# Patient Record
Sex: FEMALE | Race: WHITE | URBAN - METROPOLITAN AREA
[De-identification: names, ages, dates, MRNs, and addresses within clinical notes are randomized per-mention and may not be internally consistent; named-entity substitution may affect disease eponyms.]

---

## 2021-02-19 ENCOUNTER — HOSPITAL ENCOUNTER (OUTPATIENT)
Facility: CLINIC | Age: 41
Setting detail: OBSERVATION
Discharge: HOME | End: 2021-02-20
Attending: EMERGENCY MEDICINE | Admitting: OBSTETRICS & GYNECOLOGY
Payer: COMMERCIAL

## 2021-02-19 ENCOUNTER — ANESTHESIA (OUTPATIENT)
Dept: SURGERY | Facility: CLINIC | Age: 41
End: 2021-02-19
Payer: COMMERCIAL

## 2021-02-19 ENCOUNTER — ANESTHESIA EVENT (OUTPATIENT)
Dept: SURGERY | Facility: CLINIC | Age: 41
End: 2021-02-19
Payer: COMMERCIAL

## 2021-02-19 ENCOUNTER — TRANSFERRED RECORDS (OUTPATIENT)
Dept: HEALTH INFORMATION MANAGEMENT | Facility: CLINIC | Age: 41
End: 2021-02-19

## 2021-02-19 DIAGNOSIS — O00.90 ECTOPIC PREGNANCY, UNSPECIFIED LOCATION, UNSPECIFIED WHETHER INTRAUTERINE PREGNANCY PRESENT: ICD-10-CM

## 2021-02-19 DIAGNOSIS — O00.90 ECTOPIC PREGNANCY: ICD-10-CM

## 2021-02-19 LAB
ABO + RH BLD: NORMAL
ABO + RH BLD: NORMAL
B-HCG SERPL-ACNC: 286 IU/L (ref 0–5)
BASOPHILS # BLD AUTO: 0 10E9/L (ref 0–0.2)
BASOPHILS NFR BLD AUTO: 0.4 %
BLD GP AB SCN SERPL QL: NORMAL
BLOOD BANK CMNT PATIENT-IMP: NORMAL
DIFFERENTIAL METHOD BLD: ABNORMAL
EOSINOPHIL # BLD AUTO: 0.2 10E9/L (ref 0–0.7)
EOSINOPHIL NFR BLD AUTO: 1.5 %
ERYTHROCYTE [DISTWIDTH] IN BLOOD BY AUTOMATED COUNT: 11.9 % (ref 10–15)
HCT VFR BLD AUTO: 34.6 % (ref 35–47)
HGB BLD-MCNC: 11.8 G/DL (ref 11.7–15.7)
IMM GRANULOCYTES # BLD: 0.1 10E9/L (ref 0–0.4)
IMM GRANULOCYTES NFR BLD: 0.4 %
LABORATORY COMMENT REPORT: NORMAL
LYMPHOCYTES # BLD AUTO: 2.2 10E9/L (ref 0.8–5.3)
LYMPHOCYTES NFR BLD AUTO: 19.3 %
MCH RBC QN AUTO: 30.7 PG (ref 26.5–33)
MCHC RBC AUTO-ENTMCNC: 34.1 G/DL (ref 31.5–36.5)
MCV RBC AUTO: 90 FL (ref 78–100)
MONOCYTES # BLD AUTO: 0.8 10E9/L (ref 0–1.3)
MONOCYTES NFR BLD AUTO: 7.2 %
NEUTROPHILS # BLD AUTO: 8.1 10E9/L (ref 1.6–8.3)
NEUTROPHILS NFR BLD AUTO: 71.2 %
NRBC # BLD AUTO: 0 10*3/UL
NRBC BLD AUTO-RTO: 0 /100
PLATELET # BLD AUTO: 236 10E9/L (ref 150–450)
RBC # BLD AUTO: 3.84 10E12/L (ref 3.8–5.2)
SARS-COV-2 RNA RESP QL NAA+PROBE: NEGATIVE
SPECIMEN EXP DATE BLD: NORMAL
SPECIMEN SOURCE: NORMAL
WBC # BLD AUTO: 11.3 10E9/L (ref 4–11)

## 2021-02-19 PROCEDURE — 258N000003 HC RX IP 258 OP 636: Performed by: SURGERY

## 2021-02-19 PROCEDURE — 250N000009 HC RX 250: Performed by: NURSE ANESTHETIST, CERTIFIED REGISTERED

## 2021-02-19 PROCEDURE — 99285 EMERGENCY DEPT VISIT HI MDM: CPT | Mod: 25

## 2021-02-19 PROCEDURE — 250N000011 HC RX IP 250 OP 636: Performed by: SURGERY

## 2021-02-19 PROCEDURE — 250N000025 HC SEVOFLURANE, PER MIN: Performed by: OBSTETRICS & GYNECOLOGY

## 2021-02-19 PROCEDURE — 370N000017 HC ANESTHESIA TECHNICAL FEE, PER MIN: Performed by: OBSTETRICS & GYNECOLOGY

## 2021-02-19 PROCEDURE — 272N000001 HC OR GENERAL SUPPLY STERILE: Performed by: OBSTETRICS & GYNECOLOGY

## 2021-02-19 PROCEDURE — 86901 BLOOD TYPING SEROLOGIC RH(D): CPT | Performed by: EMERGENCY MEDICINE

## 2021-02-19 PROCEDURE — 86900 BLOOD TYPING SEROLOGIC ABO: CPT | Performed by: EMERGENCY MEDICINE

## 2021-02-19 PROCEDURE — C9803 HOPD COVID-19 SPEC COLLECT: HCPCS

## 2021-02-19 PROCEDURE — 85025 COMPLETE CBC W/AUTO DIFF WBC: CPT | Performed by: EMERGENCY MEDICINE

## 2021-02-19 PROCEDURE — 250N000011 HC RX IP 250 OP 636: Performed by: NURSE ANESTHETIST, CERTIFIED REGISTERED

## 2021-02-19 PROCEDURE — 999N000141 HC STATISTIC PRE-PROCEDURE NURSING ASSESSMENT: Performed by: OBSTETRICS & GYNECOLOGY

## 2021-02-19 PROCEDURE — 86850 RBC ANTIBODY SCREEN: CPT | Performed by: EMERGENCY MEDICINE

## 2021-02-19 PROCEDURE — 258N000001 HC RX 258: Performed by: OBSTETRICS & GYNECOLOGY

## 2021-02-19 PROCEDURE — G0378 HOSPITAL OBSERVATION PER HR: HCPCS

## 2021-02-19 PROCEDURE — 360N000076 HC SURGERY LEVEL 3, PER MIN: Performed by: OBSTETRICS & GYNECOLOGY

## 2021-02-19 PROCEDURE — 250N000009 HC RX 250: Performed by: OBSTETRICS & GYNECOLOGY

## 2021-02-19 PROCEDURE — 87635 SARS-COV-2 COVID-19 AMP PRB: CPT | Performed by: EMERGENCY MEDICINE

## 2021-02-19 PROCEDURE — 84702 CHORIONIC GONADOTROPIN TEST: CPT | Performed by: EMERGENCY MEDICINE

## 2021-02-19 PROCEDURE — 88305 TISSUE EXAM BY PATHOLOGIST: CPT | Mod: 26 | Performed by: PATHOLOGY

## 2021-02-19 PROCEDURE — 88305 TISSUE EXAM BY PATHOLOGIST: CPT | Mod: TC | Performed by: OBSTETRICS & GYNECOLOGY

## 2021-02-19 PROCEDURE — 710N000009 HC RECOVERY PHASE 1, LEVEL 1, PER MIN: Performed by: OBSTETRICS & GYNECOLOGY

## 2021-02-19 RX ORDER — DEXTROAMPHETAMINE SACCHARATE, AMPHETAMINE ASPARTATE MONOHYDRATE, DEXTROAMPHETAMINE SULFATE AND AMPHETAMINE SULFATE 6.25; 6.25; 6.25; 6.25 MG/1; MG/1; MG/1; MG/1
25 CAPSULE, EXTENDED RELEASE ORAL EVERY MORNING
COMMUNITY

## 2021-02-19 RX ORDER — NALOXONE HYDROCHLORIDE 0.4 MG/ML
0.4 INJECTION, SOLUTION INTRAMUSCULAR; INTRAVENOUS; SUBCUTANEOUS
Status: DISCONTINUED | OUTPATIENT
Start: 2021-02-19 | End: 2021-02-20

## 2021-02-19 RX ORDER — NEOSTIGMINE METHYLSULFATE 1 MG/ML
VIAL (ML) INJECTION PRN
Status: DISCONTINUED | OUTPATIENT
Start: 2021-02-19 | End: 2021-02-19

## 2021-02-19 RX ORDER — ASCORBIC ACID 250 MG
250 TABLET,CHEWABLE ORAL DAILY
COMMUNITY

## 2021-02-19 RX ORDER — LIDOCAINE HYDROCHLORIDE 20 MG/ML
INJECTION, SOLUTION INFILTRATION; PERINEURAL PRN
Status: DISCONTINUED | OUTPATIENT
Start: 2021-02-19 | End: 2021-02-19

## 2021-02-19 RX ORDER — MULTIVITAMIN,THERAPEUTIC
1 TABLET ORAL DAILY
COMMUNITY

## 2021-02-19 RX ORDER — HYDROMORPHONE HYDROCHLORIDE 1 MG/ML
.3-.5 INJECTION, SOLUTION INTRAMUSCULAR; INTRAVENOUS; SUBCUTANEOUS EVERY 5 MIN PRN
Status: DISCONTINUED | OUTPATIENT
Start: 2021-02-19 | End: 2021-02-20 | Stop reason: HOSPADM

## 2021-02-19 RX ORDER — ONDANSETRON 4 MG/1
4 TABLET, ORALLY DISINTEGRATING ORAL EVERY 30 MIN PRN
Status: DISCONTINUED | OUTPATIENT
Start: 2021-02-19 | End: 2021-02-20 | Stop reason: HOSPADM

## 2021-02-19 RX ORDER — SODIUM CHLORIDE, SODIUM LACTATE, POTASSIUM CHLORIDE, CALCIUM CHLORIDE 600; 310; 30; 20 MG/100ML; MG/100ML; MG/100ML; MG/100ML
INJECTION, SOLUTION INTRAVENOUS CONTINUOUS
Status: DISCONTINUED | OUTPATIENT
Start: 2021-02-19 | End: 2021-02-19 | Stop reason: HOSPADM

## 2021-02-19 RX ORDER — SODIUM CHLORIDE, SODIUM LACTATE, POTASSIUM CHLORIDE, CALCIUM CHLORIDE 600; 310; 30; 20 MG/100ML; MG/100ML; MG/100ML; MG/100ML
INJECTION, SOLUTION INTRAVENOUS CONTINUOUS
Status: DISCONTINUED | OUTPATIENT
Start: 2021-02-19 | End: 2021-02-20 | Stop reason: HOSPADM

## 2021-02-19 RX ORDER — MAGNESIUM HYDROXIDE 1200 MG/15ML
LIQUID ORAL PRN
Status: DISCONTINUED | OUTPATIENT
Start: 2021-02-19 | End: 2021-02-20 | Stop reason: HOSPADM

## 2021-02-19 RX ORDER — PROPOFOL 10 MG/ML
INJECTION, EMULSION INTRAVENOUS PRN
Status: DISCONTINUED | OUTPATIENT
Start: 2021-02-19 | End: 2021-02-19

## 2021-02-19 RX ORDER — FENTANYL CITRATE 50 UG/ML
INJECTION, SOLUTION INTRAMUSCULAR; INTRAVENOUS PRN
Status: DISCONTINUED | OUTPATIENT
Start: 2021-02-19 | End: 2021-02-19

## 2021-02-19 RX ORDER — GLYCOPYRROLATE 0.2 MG/ML
INJECTION, SOLUTION INTRAMUSCULAR; INTRAVENOUS PRN
Status: DISCONTINUED | OUTPATIENT
Start: 2021-02-19 | End: 2021-02-19

## 2021-02-19 RX ORDER — OXYCODONE HYDROCHLORIDE 5 MG/1
5-10 TABLET ORAL
Qty: 15 TABLET | Refills: 0 | Status: SHIPPED | OUTPATIENT
Start: 2021-02-19

## 2021-02-19 RX ORDER — DEXAMETHASONE SODIUM PHOSPHATE 4 MG/ML
INJECTION, SOLUTION INTRA-ARTICULAR; INTRALESIONAL; INTRAMUSCULAR; INTRAVENOUS; SOFT TISSUE PRN
Status: DISCONTINUED | OUTPATIENT
Start: 2021-02-19 | End: 2021-02-19

## 2021-02-19 RX ORDER — CEFAZOLIN SODIUM 1 G/3ML
INJECTION, POWDER, FOR SOLUTION INTRAMUSCULAR; INTRAVENOUS PRN
Status: DISCONTINUED | OUTPATIENT
Start: 2021-02-19 | End: 2021-02-19

## 2021-02-19 RX ORDER — NALOXONE HYDROCHLORIDE 0.4 MG/ML
0.2 INJECTION, SOLUTION INTRAMUSCULAR; INTRAVENOUS; SUBCUTANEOUS
Status: DISCONTINUED | OUTPATIENT
Start: 2021-02-19 | End: 2021-02-20

## 2021-02-19 RX ORDER — ONDANSETRON 2 MG/ML
INJECTION INTRAMUSCULAR; INTRAVENOUS PRN
Status: DISCONTINUED | OUTPATIENT
Start: 2021-02-19 | End: 2021-02-19

## 2021-02-19 RX ORDER — FENTANYL CITRATE 50 UG/ML
25-50 INJECTION, SOLUTION INTRAMUSCULAR; INTRAVENOUS
Status: DISCONTINUED | OUTPATIENT
Start: 2021-02-19 | End: 2021-02-20 | Stop reason: HOSPADM

## 2021-02-19 RX ORDER — ONDANSETRON 2 MG/ML
4 INJECTION INTRAMUSCULAR; INTRAVENOUS EVERY 30 MIN PRN
Status: DISCONTINUED | OUTPATIENT
Start: 2021-02-19 | End: 2021-02-20 | Stop reason: HOSPADM

## 2021-02-19 RX ADMIN — LIDOCAINE HYDROCHLORIDE 60 MG: 20 INJECTION, SOLUTION INFILTRATION; PERINEURAL at 21:31

## 2021-02-19 RX ADMIN — GLYCOPYRROLATE 0.2 MG: 0.2 INJECTION, SOLUTION INTRAMUSCULAR; INTRAVENOUS at 22:38

## 2021-02-19 RX ADMIN — NEOSTIGMINE METHYLSULFATE 3 MG: 1 INJECTION, SOLUTION INTRAVENOUS at 22:31

## 2021-02-19 RX ADMIN — FENTANYL CITRATE 100 MCG: 50 INJECTION, SOLUTION INTRAMUSCULAR; INTRAVENOUS at 21:31

## 2021-02-19 RX ADMIN — SODIUM CHLORIDE, POTASSIUM CHLORIDE, SODIUM LACTATE AND CALCIUM CHLORIDE: 600; 310; 30; 20 INJECTION, SOLUTION INTRAVENOUS at 20:51

## 2021-02-19 RX ADMIN — SODIUM CHLORIDE, POTASSIUM CHLORIDE, SODIUM LACTATE AND CALCIUM CHLORIDE: 600; 310; 30; 20 INJECTION, SOLUTION INTRAVENOUS at 21:26

## 2021-02-19 RX ADMIN — GLYCOPYRROLATE 0.4 MG: 0.2 INJECTION, SOLUTION INTRAMUSCULAR; INTRAVENOUS at 22:31

## 2021-02-19 RX ADMIN — MIDAZOLAM 2 MG: 1 INJECTION INTRAMUSCULAR; INTRAVENOUS at 21:23

## 2021-02-19 RX ADMIN — HYDROMORPHONE HYDROCHLORIDE 0.5 MG: 1 INJECTION, SOLUTION INTRAMUSCULAR; INTRAVENOUS; SUBCUTANEOUS at 23:34

## 2021-02-19 RX ADMIN — PROPOFOL 180 MG: 10 INJECTION, EMULSION INTRAVENOUS at 21:31

## 2021-02-19 RX ADMIN — ONDANSETRON 4 MG: 2 INJECTION INTRAMUSCULAR; INTRAVENOUS at 21:39

## 2021-02-19 RX ADMIN — CEFAZOLIN 2 G: 1 INJECTION, POWDER, FOR SOLUTION INTRAMUSCULAR; INTRAVENOUS at 21:42

## 2021-02-19 RX ADMIN — HYDROMORPHONE HYDROCHLORIDE 0.5 MG: 1 INJECTION, SOLUTION INTRAMUSCULAR; INTRAVENOUS; SUBCUTANEOUS at 21:55

## 2021-02-19 RX ADMIN — HYDROMORPHONE HYDROCHLORIDE 0.5 MG: 1 INJECTION, SOLUTION INTRAMUSCULAR; INTRAVENOUS; SUBCUTANEOUS at 23:02

## 2021-02-19 RX ADMIN — DEXAMETHASONE SODIUM PHOSPHATE 4 MG: 4 INJECTION, SOLUTION INTRA-ARTICULAR; INTRALESIONAL; INTRAMUSCULAR; INTRAVENOUS; SOFT TISSUE at 21:40

## 2021-02-19 RX ADMIN — ROCURONIUM BROMIDE 40 MG: 10 INJECTION INTRAVENOUS at 21:32

## 2021-02-19 ASSESSMENT — MIFFLIN-ST. JEOR: SCORE: 1458.33

## 2021-02-19 NOTE — ED PROVIDER NOTES
"  History   Chief Complaint:  Vaginal Bleeding     The history is provided by the patient.   History supplemented by phone call with the patient's obstetric team    Faith Wilkins is a 40 year old  female who presents with vaginal bleeding and pelvic pain.  She had onset of diffuse lower abdominal pain on the  and  with associated vaginal bleeding beginning the . She has gone through two pads today. She took Ibuprofen earlier today with good relief.  [facility] was [facility] at [facility] by an obstetrician, and Estefany states they found a mass on the right side adnexa via ultrasound that is consistent with an ectopic pregnancy. She was sent to the hospital for ultimate surgery but the OR was not ready yet, so she was sent to the ER. She is not on any fertility treatments.  She denies vomiting, diarrhea, cough, shortness of breath, or other concerns. She is not sure how far along she is in the pregnancy with a normal period last in December and a 16 day \"period\" in January. She has had 3 miscarriages in the past ending in the first trimester.  She is not on blood thinners.    Review of Systems   All other systems reviewed and are negative.      Allergies:  The patient has no known allergies.     Medications:  Adderall XR    Past Medical History:    ADHD  Chondromalacia of patella      Past Surgical History:    [place] tooth extraction    Social History:  The patient presents to the ER from clinic.   No smoking history.     Physical Exam     Patient Vitals for the past 24 hrs:   BP Temp Temp src Pulse Resp SpO2   21 1754 112/76 98.4  F (36.9  C) Oral 87 18 97 %       Physical Exam  General: Nontoxic-appearing woman sitting upright in room 1  HENT: mucous membranes moist  CV: rate as above, regular rhythm  Resp: normal effort, speaks in full phrases, no stridor, no cough observed  GI: abdomen soft and minimal bilateral tenderness to far lower abdomen, no guarding, no palpable masses or " uterine fundus  : deferred  MSK: no bony tenderness, no CVAT  Skin: appropriately warm and dry, no abdominal rash  Neuro: alert, clear speech, oriented  Psych: cooperative    Emergency Department Course     Laboratory:  CBC: WBC 11.3(H), HGB 11.8,    ABO/RH type and screen: O+, antibody negative   HCG qualitative: 286(H)    Asymptomatic COVID19 Virus PCR by nasopharyngeal swab pending     Emergency Department Course:    Reviewed:  I reviewed nursing notes, vitals, past medical history and care everywhere    Assessments:  1735 I obtained history and examined the patient as noted above.   1850 I rechecked the patient and explained findings.     Consults:   1820 I spoke with Dr. Loja of the OB GYN service from [facility] regarding patient's presentation, findings, and plan of care. She is comfortable admitting the patient until and OR becomes available.     Disposition:  The patient was admitted to the hospital under the care of Dr. Loja.       Impression & Plan   Medical Decision Making:  She was sent here from her OB clinic in the setting of a recent positive pregnancy test, vaginal bleeding, pelvic pain, and an adnexal mass seen on ultrasound in clinic just prior to arrival highly suspicious for an ectopic pregnancy.  Plan was in place for her to go to the operating room with OB for surgical intervention, but due to inability to arrange for her to be taken directly to the operating room, she was sent to the emergency room.  Due to ongoing delays and operative room availability, patient will be placed in the observation unit while awaiting surgical intervention later today.  I spoke with the surgeon who anticipates procedural intervention at the earliest availability.  Hemodynamic status is satisfactory at this time.  She will be kept NPO.  Patient understands and agrees with this provisional plan.      Covid-19  Faith Wilkins was evaluated during a global COVID-19 pandemic, which necessitated  consideration that the patient might be at risk for infection with the SARS-CoV-2 virus that causes COVID-19.   Applicable protocols for evaluation were followed during the patient's care.   COVID-19 was considered as part of the patient's evaluation. The plan for testing is:  a test was obtained during this visit.    Diagnosis:    ICD-10-CM    1. Ectopic pregnancy, unspecified location, unspecified whether intrauterine pregnancy present  O00.90 CBC with platelets differential     HCG quantitative pregnancy     ABO/Rh type and screen     Asymptomatic SARS-CoV-2 COVID-19 Virus (Coronavirus) by PCR       Scribe Disclosure:  I, Kalyanidee Fajardo, am serving as a scribe at 5:32 PM on 2/19/2021 to document services personally performed by Efe Abraham MD based on my observations and the provider's statements to me.     This note was completed in part using Dragon voice recognition software. Although reviewed after completion, some word and grammatical errors may occur.        Efe Abraham MD  02/19/21 1938

## 2021-02-19 NOTE — H&P (VIEW-ONLY)
"  History   Chief Complaint:  Vaginal Bleeding     The history is provided by the patient.   History supplemented by phone call with the patient's obstetric team    Faith Wilkisn is a 40 year old  female who presents with vaginal bleeding and pelvic pain.  She had onset of diffuse lower abdominal pain on the  and  with associated vaginal bleeding beginning the . She has gone through two pads today. She took Ibuprofen earlier today with good relief.  She was seen at [facility] today by an obstetrician, and Estefany states they found a mass on the right side adnexa via ultrasound that is consistent with an ectopic pregnancy. She was sent to the hospital for ultimate surgery but the OR was not ready yet, so she was sent to the ER. She is not on any fertility treatments.  She denies vomiting, diarrhea, cough, shortness of breath, or other concerns. She is not sure how far along she is in the pregnancy with a normal period last in December and a 16 day \"period\" in January. She has had 3 miscarriages in the past ending in the first trimester.  She is not on blood thinners.    Review of Systems   All other systems reviewed and are negative.      Allergies:  The patient has no known allergies.     Medications:  Adderall XR    Past Medical History:    ADHD  Chondromalacia of patella      Past Surgical History:    [place] tooth extraction    Social History:  The patient presents to the ER from clinic.   No smoking history.     Physical Exam     Patient Vitals for the past 24 hrs:   BP Temp Temp src Pulse Resp SpO2   21 1754 112/76 98.4  F (36.9  C) Oral 87 18 97 %       Physical Exam  General: Nontoxic-appearing woman sitting upright in room 1  HENT: mucous membranes moist  CV: rate as above, regular rhythm  Resp: normal effort, speaks in full phrases, no stridor, no cough observed  GI: abdomen soft and minimal bilateral tenderness to far lower abdomen, no guarding, no palpable masses or " uterine fundus  : deferred  MSK: no bony tenderness, no CVAT  Skin: appropriately warm and dry, no abdominal rash  Neuro: alert, clear speech, oriented  Psych: cooperative    Emergency Department Course     Laboratory:  CBC: WBC 11.3(H), HGB 11.8,    ABO/RH type and screen: O+, antibody negative   HCG qualitative: 286(H)    Asymptomatic COVID19 Virus PCR by nasopharyngeal swab pending     Emergency Department Course:    Reviewed:  I reviewed nursing notes, vitals, past medical history and care everywhere    Assessments:  1735 I obtained history and examined the patient as noted above.   1850 I rechecked the patient and explained findings.     Consults:   1820 I spoke with Dr. Loja of the OB GYN service from [facility] regarding patient's presentation, findings, and plan of care. She is comfortable admitting the patient until and OR becomes available.     Disposition:  The patient was admitted to the hospital under the care of Dr. Loja.       Impression & Plan   Medical Decision Making:  She was sent here from her OB clinic in the setting of a recent positive pregnancy test, vaginal bleeding, pelvic pain, and an adnexal mass seen on ultrasound in clinic just prior to arrival highly suspicious for an ectopic pregnancy.  Plan was in place for her to go to the operating room with OB for surgical intervention, but due to inability to arrange for her to be taken directly to the operating room, she was sent to the emergency room.  Due to ongoing delays and operative room availability, patient will be placed in the observation unit while awaiting surgical intervention later today.  I spoke with the surgeon who anticipates procedural intervention at the earliest availability.  Hemodynamic status is satisfactory at this time.  She will be kept NPO.  Patient understands and agrees with this provisional plan.      Covid-19  Faith Wilkins was evaluated during a global COVID-19 pandemic, which necessitated  consideration that the patient might be at risk for infection with the SARS-CoV-2 virus that causes COVID-19.   Applicable protocols for evaluation were followed during the patient's care.   COVID-19 was considered as part of the patient's evaluation. The plan for testing is:  a test was obtained during this visit.    Diagnosis:    ICD-10-CM    1. Ectopic pregnancy, unspecified location, unspecified whether intrauterine pregnancy present  O00.90 CBC with platelets differential     HCG quantitative pregnancy     ABO/Rh type and screen     Asymptomatic SARS-CoV-2 COVID-19 Virus (Coronavirus) by PCR       Scribe Disclosure:  I, Kalyanidee Fajardo, am serving as a scribe at 5:32 PM on 2/19/2021 to document services personally performed by Efe Abraham MD based on my observations and the provider's statements to me.     This note was completed in part using Dragon voice recognition software. Although reviewed after completion, some word and grammatical errors may occur.        Efe Abraham MD  02/19/21 1938

## 2021-02-20 VITALS
TEMPERATURE: 96.1 F | HEIGHT: 66 IN | WEIGHT: 170.1 LBS | DIASTOLIC BLOOD PRESSURE: 63 MMHG | RESPIRATION RATE: 14 BRPM | SYSTOLIC BLOOD PRESSURE: 104 MMHG | BODY MASS INDEX: 27.34 KG/M2 | OXYGEN SATURATION: 100 % | HEART RATE: 95 BPM

## 2021-02-20 LAB — GLUCOSE BLDC GLUCOMTR-MCNC: 170 MG/DL (ref 70–99)

## 2021-02-20 PROCEDURE — 258N000003 HC RX IP 258 OP 636: Performed by: OBSTETRICS & GYNECOLOGY

## 2021-02-20 PROCEDURE — 96375 TX/PRO/DX INJ NEW DRUG ADDON: CPT

## 2021-02-20 PROCEDURE — G0378 HOSPITAL OBSERVATION PER HR: HCPCS

## 2021-02-20 PROCEDURE — 96374 THER/PROPH/DIAG INJ IV PUSH: CPT

## 2021-02-20 PROCEDURE — 999N001017 HC STATISTIC GLUCOSE BY METER IP

## 2021-02-20 PROCEDURE — 96376 TX/PRO/DX INJ SAME DRUG ADON: CPT

## 2021-02-20 PROCEDURE — 250N000011 HC RX IP 250 OP 636: Performed by: OBSTETRICS & GYNECOLOGY

## 2021-02-20 RX ORDER — ACETAMINOPHEN 325 MG/1
650 TABLET ORAL EVERY 6 HOURS PRN
Status: DISCONTINUED | OUTPATIENT
Start: 2021-02-20 | End: 2021-02-20 | Stop reason: HOSPADM

## 2021-02-20 RX ORDER — HYDROMORPHONE HYDROCHLORIDE 1 MG/ML
0.2 INJECTION, SOLUTION INTRAMUSCULAR; INTRAVENOUS; SUBCUTANEOUS
Status: DISCONTINUED | OUTPATIENT
Start: 2021-02-20 | End: 2021-02-20 | Stop reason: HOSPADM

## 2021-02-20 RX ORDER — OXYCODONE HYDROCHLORIDE 5 MG/1
5-10 TABLET ORAL
Status: DISCONTINUED | OUTPATIENT
Start: 2021-02-20 | End: 2021-02-20 | Stop reason: HOSPADM

## 2021-02-20 RX ORDER — HYDROXYZINE HYDROCHLORIDE 25 MG/1
25 TABLET, FILM COATED ORAL EVERY 6 HOURS PRN
Status: DISCONTINUED | OUTPATIENT
Start: 2021-02-20 | End: 2021-02-20 | Stop reason: HOSPADM

## 2021-02-20 RX ORDER — ONDANSETRON 4 MG/1
4 TABLET, ORALLY DISINTEGRATING ORAL EVERY 6 HOURS PRN
Status: DISCONTINUED | OUTPATIENT
Start: 2021-02-20 | End: 2021-02-20 | Stop reason: HOSPADM

## 2021-02-20 RX ORDER — PROCHLORPERAZINE MALEATE 10 MG
10 TABLET ORAL EVERY 6 HOURS PRN
Status: DISCONTINUED | OUTPATIENT
Start: 2021-02-20 | End: 2021-02-20 | Stop reason: HOSPADM

## 2021-02-20 RX ORDER — SODIUM CHLORIDE, SODIUM LACTATE, POTASSIUM CHLORIDE, CALCIUM CHLORIDE 600; 310; 30; 20 MG/100ML; MG/100ML; MG/100ML; MG/100ML
INJECTION, SOLUTION INTRAVENOUS CONTINUOUS
Status: DISCONTINUED | OUTPATIENT
Start: 2021-02-20 | End: 2021-02-20 | Stop reason: HOSPADM

## 2021-02-20 RX ORDER — KETOROLAC TROMETHAMINE 30 MG/ML
30 INJECTION, SOLUTION INTRAMUSCULAR; INTRAVENOUS EVERY 6 HOURS
Status: DISCONTINUED | OUTPATIENT
Start: 2021-02-20 | End: 2021-02-20 | Stop reason: HOSPADM

## 2021-02-20 RX ORDER — LIDOCAINE 40 MG/G
CREAM TOPICAL
Status: DISCONTINUED | OUTPATIENT
Start: 2021-02-20 | End: 2021-02-20 | Stop reason: HOSPADM

## 2021-02-20 RX ORDER — NALOXONE HYDROCHLORIDE 0.4 MG/ML
0.4 INJECTION, SOLUTION INTRAMUSCULAR; INTRAVENOUS; SUBCUTANEOUS
Status: DISCONTINUED | OUTPATIENT
Start: 2021-02-20 | End: 2021-02-20 | Stop reason: HOSPADM

## 2021-02-20 RX ORDER — IBUPROFEN 600 MG/1
600 TABLET, FILM COATED ORAL EVERY 6 HOURS PRN
Status: DISCONTINUED | OUTPATIENT
Start: 2021-02-21 | End: 2021-02-20 | Stop reason: HOSPADM

## 2021-02-20 RX ORDER — ONDANSETRON 2 MG/ML
4 INJECTION INTRAMUSCULAR; INTRAVENOUS EVERY 6 HOURS PRN
Status: DISCONTINUED | OUTPATIENT
Start: 2021-02-20 | End: 2021-02-20 | Stop reason: HOSPADM

## 2021-02-20 RX ORDER — NALOXONE HYDROCHLORIDE 0.4 MG/ML
0.2 INJECTION, SOLUTION INTRAMUSCULAR; INTRAVENOUS; SUBCUTANEOUS
Status: DISCONTINUED | OUTPATIENT
Start: 2021-02-20 | End: 2021-02-20 | Stop reason: HOSPADM

## 2021-02-20 RX ADMIN — SODIUM CHLORIDE, POTASSIUM CHLORIDE, SODIUM LACTATE AND CALCIUM CHLORIDE: 600; 310; 30; 20 INJECTION, SOLUTION INTRAVENOUS at 10:23

## 2021-02-20 RX ADMIN — KETOROLAC TROMETHAMINE 30 MG: 30 INJECTION, SOLUTION INTRAMUSCULAR at 01:45

## 2021-02-20 RX ADMIN — ONDANSETRON 4 MG: 2 INJECTION INTRAMUSCULAR; INTRAVENOUS at 05:22

## 2021-02-20 RX ADMIN — KETOROLAC TROMETHAMINE 30 MG: 30 INJECTION, SOLUTION INTRAMUSCULAR at 13:10

## 2021-02-20 RX ADMIN — KETOROLAC TROMETHAMINE 30 MG: 30 INJECTION, SOLUTION INTRAMUSCULAR at 07:38

## 2021-02-20 RX ADMIN — SODIUM CHLORIDE, POTASSIUM CHLORIDE, SODIUM LACTATE AND CALCIUM CHLORIDE: 600; 310; 30; 20 INJECTION, SOLUTION INTRAVENOUS at 00:35

## 2021-02-20 ASSESSMENT — MIFFLIN-ST. JEOR: SCORE: 1463.32

## 2021-02-20 NOTE — PLAN OF CARE
A/O. Ambulating in room and tanner.  Taking diet well. Voiding in good amounts. Discharge instruction and meds given to patient. IV dced. Patient discharged via w/c to family and home.

## 2021-02-20 NOTE — ED NOTES
[facility]  ED Nurse Handoff Report    ED Chief complaint: Vaginal Bleeding      ED Diagnosis:   Final diagnoses:   Ectopic pregnancy, unspecified location, unspecified whether intrauterine pregnancy present       Code Status: Full Code    Allergies: Not on File    Patient Story: [facility] seen at [facility] today by Dr. Loja, and Estefany states they found a mass on the right side via ultrasound that seems consistent with an ectopic pregnancy.  Focused Assessment:  Pt at the hospital for per Dr Lopez request- surgery tonight but the OR not ready yet.  Pt denies needing any pain medication at this time.   Results for orders placed or performed during the hospital encounter of 02/19/21   CBC with platelets differential     Status: Abnormal   Result Value Ref Range    WBC 11.3 (H) 4.0 - 11.0 10e9/L    RBC Count 3.84 3.8 - 5.2 10e12/L    Hemoglobin 11.8 11.7 - 15.7 g/dL    Hematocrit 34.6 (L) 35.0 - 47.0 %    MCV 90 78 - 100 fl    MCH 30.7 26.5 - 33.0 pg    MCHC 34.1 31.5 - 36.5 g/dL    RDW 11.9 10.0 - 15.0 %    Platelet Count 236 150 - 450 10e9/L    Diff Method Automated Method     % Neutrophils 71.2 %    % Lymphocytes 19.3 %    % Monocytes 7.2 %    % Eosinophils 1.5 %    % Basophils 0.4 %    % Immature Granulocytes 0.4 %    Nucleated RBCs 0 0 /100    Absolute Neutrophil 8.1 1.6 - 8.3 10e9/L    Absolute Lymphocytes 2.2 0.8 - 5.3 10e9/L    Absolute Monocytes 0.8 0.0 - 1.3 10e9/L    Absolute Eosinophils 0.2 0.0 - 0.7 10e9/L    Absolute Basophils 0.0 0.0 - 0.2 10e9/L    Abs Immature Granulocytes 0.1 0 - 0.4 10e9/L    Absolute Nucleated RBC 0.0    HCG quantitative pregnancy     Status: Abnormal   Result Value Ref Range    HCG Quantitative Serum 286 (H) 0 - 5 IU/L   ABO/Rh type and screen     Status: None (In process)   Result Value Ref Range    ABO PENDING     Antibody Screen PENDING     Test Valid Only At [facility]        Specimen Expires 02/22/2021          Treatments and/or  interventions provided: Monitoring  Patient's response to treatments and/or interventions: Tolerating    To be done/followed up on inpatient unit:  Monitor/surgery    Does this patient have any cognitive concerns?: None    Activity level - Baseline/Home:  Independent  Activity Level - Current:   Independent    Patient's Preferred language: Data Unavailable   Needed?: No    Isolation: None  Infection: Not Applicable  Patient tested for COVID 19 prior to admission: YES  Bariatric?: No    Vital Signs:   Vitals:    02/19/21 1754   BP: 112/76   Pulse: 87   Resp: 18   Temp: 98.4  F (36.9  C)   TempSrc: Oral   SpO2: 97%       Cardiac Rhythm:     Was the PSS-3 completed:   Yes  What interventions are required if any?               Family Comments: None  OBS brochure/video discussed/provided to patient/family: Yes              Name of person given brochure if not patient:               Relationship to patient:     For the majority of the shift this patient's behavior was Green.   Behavioral interventions performed were   .    ED NURSE PHONE NUMBER: *[phone]

## 2021-02-20 NOTE — BRIEF OP NOTE
[facility]    Brief Operative Note    Pre-operative diagnosis: Ectopic pregnancy [O00.90], left ovarian cyst, possible endometrial polyp  Post-operative diagnosis Right tubal pregnancy, simple left ovarian cyst, possible endometrial polyp, pelvic adhesions    Procedure: Procedure(s):  LAPAROSCOPIC RIGHT SALPINGECTOMY  DILATION AND CURETTAGE, USING SUCTION  DRAINAGE OF LEFT OVARIAN CYST  Surgeon: Surgeon(s) and Role:     * Nano Loja MD - Primary  Anesthesia: General   Estimated blood loss: 10 cc  Drains: None  Specimens:   ID Type Source Tests Collected by Time Destination   A : Endometrial Curretting Tissue Endometrium SURGICAL PATHOLOGY EXAM Nano Loja MD 2/19/2021  9:52 PM    B : Right Fallopian Tube with Ectopic Pregnancy Tissue Fallopian Tube, Right SURGICAL PATHOLOGY EXAM Nano Loja MD 2/19/2021 10:16 PM      Findings:  Right tubal ectopic in mid to distal tube, approx 5 x 2 cm with fimbriated end adhered to right ovary and right pelvic sidewall with clot otherwise normal right ovary. Minimal tissue in endometrial cavity and no obvious polyp.  Simple left ovarian cyst drained of straw colored fluid. Left fallopian tube abnormal - only 2 cm in length with no distal tube identified. Omentum adhered to left pelvic sidewall and undersurface of left ovary (attachment to left ovary taken down).   Complications: None.  Implants: * No implants in log *

## 2021-02-20 NOTE — PROGRESS NOTES
Observation goals PRIOR TO DISCHARGE     Comments: List all goals to be met before discharge home:        Ambulatory: partially met, requiring 2 people  Pain well controlled: partially met  Urinating without difficulty: not met, required straight cath  Tolerating diet: partially met, tolerating sips of clear liquid     Notify Provider when observation goals have been met and patient is ready for discharge.

## 2021-02-20 NOTE — PROGRESS NOTES
Patient's purse, rings (2), and watch sent home with patient's mother. Clothing, wig, shoes, phone, and glasses labeled and sent to PACU.

## 2021-02-20 NOTE — PLAN OF CARE
RECEIVING UNIT ED HANDOFF REVIEW    ED Nurse Handoff Report was reviewed by: Lata Burroughs RN on February 19, 2021 at 7:52 PM

## 2021-02-20 NOTE — ANESTHESIA PROCEDURE NOTES
Airway   Date/Time: 2/19/2021 9:34 PM   Patient location during procedure: OR  Staff -   Performed By: CRNA    Consent for Airway   Urgency: elective    Indications and Patient Condition  Indications for airway management: chaya-procedural and airway protection  Mask difficulty assessment: 1 - vent by mask    Final Airway Details  Final airway type: endotracheal airway  Successful airway:ETT - single  Endotracheal Airway Details   ETT size (mm): 7.0  Cuffed: yes  Cuff volume (mL): 7  Successful intubation technique: direct laryngoscopy  Grade View of Cords: 1  Adjucts: stylet  Measured from: lips  Secured at (cm): 21  Secured with: pink tape    Post intubation assessment   Placement verified by: capnometry, equal breath sounds and chest rise   Number of attempts at approach: 1  Secured with:pink tape

## 2021-02-20 NOTE — OP NOTE
Procedure Date: 02/19/2021      PREOPERATIVE DIAGNOSES:   1.  Right ectopic pregnancy.   2.  Left ovarian cyst.   3.  Possible endometrial polyp.      POSTOPERATIVE DIAGNOSES:   1.  Right fallopian tube ectopic pregnancy.   2.  Simple left ovarian cyst.   3.  Possible endometrial polyp.   4.  Pelvic adhesions      PROCEDURE:   1.  Operative laparoscopy with right salpingectomy, left ovarian cyst drainage, and lysis of adhesions.   2.  Suction dilation and curettage.      SURGEON:  Nano Loja MD      ANESTHESIA:  General endotracheal.      DRAINS:  None.      IMPLANTS:  None.      COMPLICATIONS:  None.      SPECIMENS:  Right fallopian tube with ectopic pregnancy and endometrial curettings.      OPERATIVE FINDINGS:  Suction curettage removed a very small amount of tissue.  There was no obvious endometrial polyp.  Laparoscopy revealed there to be a right fallopian tube ectopic pregnancy in the mid to distal portion of the fallopian tube, measuring 5 x 2 cm.  The distal portion of the fallopian tube had clot extruding from the end of it, and it was slightly adhered with a clot to the right ovary and the right pelvic sidewall.  The uterus appeared normal.  The left ovary had a 4 cm simple ovarian cyst that was filled with clear, straw-colored fluid.  The left fallopian tube was abnormal.  The left fallopian tube was only approximately 3 cm in length and appeared to be missing the distal portion of the tube.  There were no fimbria visualized.  The omentum was adhered to the left pelvic sidewall and undersurface of the left ovary.  The adhesions from the omentum to the undersurface of the left ovary were lysed; however, the adhesions from the omentum to the left pelvic sidewall were not taken down, as these were dense, and the ureter was not well visualized.  It was unclear what was causing the adhesions or what had caused the abnormality in the left fallopian tube.  Upper abdomen appeared normal.      OPERATIVE TECHNIQUE:   After informed consent, the patient was taken to the operating room, where she was given a general anesthetic.  She was placed in dorsal lithotomy position and prepped and draped in the usual sterile fashion.  A timeout was performed.  A speculum was inserted in the vagina.  Her cervix was grasped with a single-tooth tenaculum.  Her cervix was dilated to 7 mm.  A 7 mm curved suction curette was used to make several passes, with no significant return of tissue.  A sharp curettage was then performed, with minimal tissue returned as well.  The endometrium was smooth to curettage, and there were no obvious polyps.  A ROBERTO uterine manipulator was inserted.  The tenaculum and speculum were removed.  Attention was then turned to the abdomen, where a 5 mm infraumbilical skin incision was made with the scalpel.  The abdomen was tented up as the Step Veress needle was inserted.  Saline drop test was performed.  The abdomen was then insufflated under low pressure with carbon dioxide.  The Veress needle was removed.  The 5 mm trocar was then placed through the incision, and intra-abdominal placement was confirmed with the laparoscope.  A 5 mm left lower quadrant trocar and a 5 mm mid abdominal trocar were both placed under direct visualization, noting good hemostasis.  The decision was made to remove the right ovary, as there was significant damage that appeared to it from the ectopic pregnancy.  The PK cutting forceps was used to cauterize and transect the right mesosalpinx and the right proximal fallopian tube.  The right fallopian tube with its ectopic pregnancy inside of it was placed into a 5 mm retrieval bag and removed without difficulty and handed off for pathology.  The pelvis was irrigated.  The right salpingectomy site was noted to be hemostatic.  The omental adhesions to the undersurface of the left ovary were cauterized and transected with the PK cutting forceps.  The hook cautery was used to make a 1 cm incision  over the left ovarian cyst, and this was drained of clear fluid, and the incision site was noted to be hemostatic.  The pelvis was again irrigated.  All sites were noted to be hemostatic.  The gas and trocars were removed from the abdomen.  The skin incisions were closed with subcuticular 4-0 Vicryl, Steri-Strips and Band-Aids.  The uterine manipulator was then removed as well.  The patient tolerated the procedure well.  Counts were correct x 2.  She was transferred to the recovery room in stable condition.         LEAH BISHOP MD             D: 2021   T: 2021   MT: JAYLEEN      Name:     DARBY RAINEY   MRN:      [id number]        Account:        [id number]   :      1980           Procedure Date: 2021      Document: [id number]

## 2021-02-20 NOTE — ANESTHESIA POSTPROCEDURE EVALUATION
Patient: Faith Wilkins    Procedure(s):  LAPAROSCOPIC RIGHT SALPINGECTOMY  DILATION AND CURETTAGE, USING SUCTION  DRAINAGE OF LEFT OVARIAN CYST    Diagnosis:Ectopic pregnancy [O00.90]  Diagnosis Additional Information: No value filed.    Anesthesia Type:  General    Note:  Disposition: Inpatient   Postop Pain Control: Uneventful            Sign Out: Well controlled pain   PONV: No   Neuro/Psych: Uneventful            Sign Out: Acceptable/Baseline neuro status   Airway/Respiratory: Uneventful            Sign Out: Acceptable/Baseline resp. status   CV/Hemodynamics: Uneventful            Sign Out: Acceptable CV status   Other NRE: NONE   DID A NON-ROUTINE EVENT OCCUR? No         Last vitals:  Vitals:    02/20/21 0456 02/20/21 0550 02/20/21 0556   BP: 101/58     Pulse: 85  72   Resp: 11  12   Temp: 35.4  C (95.8  F)     SpO2: 99% 96% 99%       Last vitals prior to Anesthesia Care Transfer:  CRNA VITALS  2/19/2021 2215 - 2/19/2021 2315      2/19/2021             Pulse:  103    Ht Rate:  101    SpO2:  97 %    Resp Rate (observed):  15    Resp Rate (set):  10          Electronically Signed By: Amandeep Perez MD  February 20, 2021  6:34 AM

## 2021-02-20 NOTE — ANESTHESIA PREPROCEDURE EVALUATION
Anesthesia Pre-Procedure Evaluation    Patient: Faith Wilkins   MRN: [id number] : 1980        Preoperative Diagnosis: Ectopic pregnancy [O00.90]   Procedure : Procedure(s):  LAPAROSCOPIC SALPINGECTOMY OR SALPINGOSTOMY  DILATION AND CURETTAGE  POSSIBLE LEFT OVARIAN CYSTECTOMY     Past Medical History:   Diagnosis Date     Polycystic ovaries       History reviewed. No pertinent surgical history.   No Known Allergies   Social History     Tobacco Use     Smoking status: Never Smoker     Smokeless tobacco: Never Used   Substance Use Topics     Alcohol use: Not Currently      Wt Readings from Last 1 Encounters:   21 76.7 kg (169 lb)        Anesthesia Evaluation            ROS/MED HX  ENT/Pulmonary:  - neg pulmonary ROS     Neurologic:  - neg neurologic ROS     Cardiovascular:  - neg cardiovascular ROS     METS/Exercise Tolerance:     Hematologic:  - neg hematologic  ROS     Musculoskeletal:  - neg musculoskeletal ROS     GI/Hepatic:  - neg GI/hepatic ROS  (-) GERD   Renal/Genitourinary:  - neg Renal ROS     Endo:  - neg endo ROS     Psychiatric/Substance Use:     (+) psychiatric history other (comment) (ADHD)     Infectious Disease:  - neg infectious disease ROS     Malignancy:       Other:            Physical Exam    Airway        Mallampati: I   TM distance: > 3 FB   Neck ROM: full   Mouth opening: > 3 cm    Respiratory Devices and Support         Dental  no notable dental history         Cardiovascular   cardiovascular exam normal          Pulmonary   pulmonary exam normal                OUTSIDE LABS:  CBC:   Lab Results   Component Value Date    WBC 11.3 (H) 2021    HGB 11.8 2021    HCT 34.6 (L) 2021     2021     BMP: No results found for: NA, POTASSIUM, CHLORIDE, CO2, BUN, CR, GLC  COAGS: No results found for: PTT, INR, FIBR  POC: No results found for: BGM, HCG, HCGS  HEPATIC: No results found for: ALBUMIN, PROTTOTAL, ALT, AST, GGT, ALKPHOS, BILITOTAL, BILIDIRECT,  SOLA  OTHER: No results found for: PH, LACT, A1C, SILVESTRE, PHOS, MAG, LIPASE, AMYLASE, TSH, T4, T3, CRP, SED    Anesthesia Plan    ASA Status:  1   NPO Status:  NPO Appropriate    Anesthesia Type: General.     - Airway: ETT   Induction: RSI, Intravenous, Propofol.   Maintenance: Balanced.        Consents    Anesthesia Plan(s) and associated risks, benefits, and realistic alternatives discussed. Questions answered and patient/representative(s) expressed understanding.     - Discussed with:  Patient         Postoperative Care    Pain management: IV analgesics.   PONV prophylaxis: Ondansetron (or other 5HT-3), Dexamethasone or Solumedrol, Background Propofol Infusion     Comments:                Amandeep Perez MD

## 2021-02-20 NOTE — PLAN OF CARE
A&Ox4. VSS on 1L NC. Capnography on. Pt is tearful at times. Pt ambulated to bathroom but became nauseous and had 3 episodes of emesis. PRN Zofran and cool wash cloth given with relief. Bladder scanned for 630ml, attempted to void but only had about 30ml of red output. Straight cathed for 600ml. Pad was changed twice with minimal vaginal bleeding. Ice packs applied to abdomen. Scheduled Toradol given for pain. Incisions are CDI. Tolerating few sips of clear liquid. IVF @100. Reports dizziness when laying down and when walking. Denies SOB and lightheadedness. POD 1 BG was 170. Continue to monitor.

## 2021-02-20 NOTE — PLAN OF CARE
Observation goals PRIOR TO DISCHARGE     Comments: List all goals to be met before discharge home:        Ambulatory: ambulated once in [facility]  Pain well controlled: using Toradol and ice  Urinating without difficulty: voided once  Tolerating diet: taking fluids and crackers    A/O. Up with standby assist. Taking liquids well. No further nausea or dizziness. Pale. Scant amount of old drainage on chaya pad. Incisions c/d/i.    Notify Provider when observation goals have been met and patient is ready for discharge.

## 2021-02-20 NOTE — PROGRESS NOTES
Patient transferred to observation unit. Report given to ERNESTO Stephenson. Patient belongings sent with patient (clothes, wig, shoes, phone, glasses, cell phone). Prescription sent with chart.

## 2021-02-20 NOTE — PHARMACY-ADMISSION MEDICATION HISTORY
Pharmacy Medication History  Admission medication history interview status for the 2/19/2021  admission is complete. See EPIC admission navigator for prior to admission medications     Location of Interview: Phone  Medication history sources: Patient    Significant changes made to the medication list:  Added entire list    In the past week, patient estimated taking medication this percent of the time: greater than 90%    Additional medication history information:   none    Medication reconciliation completed by provider prior to medication history? No    Time spent in this activity: 15 mins    Prior to Admission medications    Medication Sig Last Dose Taking? Auth Provider   amphetamine-dextroamphetamine (ADDERALL XR) 25 MG 24 hr capsule Take 25 mg by mouth every morning 2/19/2021 at AM Yes Unknown, Entered By History   ascorbic acid (VITAMIN C) 250 MG CHEW chewable tablet Take 250 mg by mouth daily 2/18/2021 at AM Yes Unknown, Entered By History   Multiple Vitamins-Minerals (ZINC PO) Take 1 tablet by mouth daily 2/18/2021 at AM Yes Unknown, Entered By History   multivitamin, therapeutic (THERA-VIT) TABS tablet Take 1 tablet by mouth daily 2/18/2021 at AM Yes Unknown, Entered By History   VITAMIN D PO Take 1 tablet by mouth daily 2/18/2021 at AM Yes Unknown, Entered By History       The information provided in this note is only as accurate as the sources available at the time of update(s)     Eulalia Sanchez PharmD

## 2021-02-20 NOTE — ANESTHESIA CARE TRANSFER NOTE
Patient: Faith Wilkins    Procedure(s):  LAPAROSCOPIC RIGHT SALPINGECTOMY  DILATION AND CURETTAGE, USING SUCTION  DRAINAGE OF LEFT OVARIAN CYST    Diagnosis: Ectopic pregnancy [O00.90]  Diagnosis Additional Information: No value filed.    Anesthesia Type:   General     Note:    Oropharynx: oropharynx clear of all foreign objects  Level of Consciousness: awake  Oxygen Supplementation: nasal cannula    Independent Airway: airway patency satisfactory and stable  Dentition: dentition unchanged  Vital Signs Stable: post-procedure vital signs reviewed and stable  Report to RN Given: handoff report given  Patient transferred to: PACU          Vitals: (Last set prior to Anesthesia Care Transfer)  CRNA VITALS  2/19/2021 2215 - 2/19/2021 2252      2/19/2021             Pulse:  103    Ht Rate:  101    SpO2:  97 %    Resp Rate (observed):  15    Resp Rate (set):  10        Electronically Signed By: JOHN Curiel [facility]  February 19, 2021  10:52 PM

## 2021-02-24 LAB — COPATH REPORT: NORMAL

## 2021-03-19 NOTE — DISCHARGE SUMMARY
[facility] Discharge Summary    Faith Wilkins MRN# [id number]   Age: 40 year old YOB: 1980     Date of Admission:  2/19/2021  Date of Discharge::  2/202021   Admitting Physician:  Nano Loja MD    Discharge Physician:  Nano Loja MD             Admission Diagnoses:   Ectopic pregnancy          Discharge Diagnosis:   Ectopic pregnancy          Procedures:   Procedure(s):  L/S with right salpingectomy, RAI, drainage of left ovarian cyst, Suction D&C                Medications Prior to Admission:   None          Discharge Medications:     Discharge Medication List as of 2/20/2021  1:50 PM      START taking these medications    Details   oxyCODONE (ROXICODONE) 5 MG tablet Take 1-2 tablets (5-10 mg) by mouth every 3 hours as needed (Moderate to Severe Pain), Disp-15 tablet, R-0, Local Print         CONTINUE these medications which have NOT CHANGED    Details   amphetamine-dextroamphetamine (ADDERALL XR) 25 MG 24 hr capsule Take 25 mg by mouth every morning, Historical      ascorbic acid (VITAMIN C) 250 MG CHEW chewable tablet Take 250 mg by mouth daily, Historical      Multiple Vitamins-Minerals (ZINC PO) Take 1 tablet by mouth daily, Historical      multivitamin, therapeutic (THERA-VIT) TABS tablet Take 1 tablet by mouth daily, Historical      VITAMIN D PO Take 1 tablet by mouth daily, Historical                         Hospital Course:   The patient's hospital course was unremarkable.  She recovered as anticipated and experienced no post-operative complications.           Discharge Instructions and Follow-Up:   Discharge diet: Regular   Discharge activity: Activity as tolerated   Discharge follow-up: Follow up with me in 2 weeks   Wound care Steri-strips off in 7 days           Discharge Disposition:   Discharged to home          Nano Loja MD

## (undated) DEVICE — UTERINE MANIPULATOR RUMI 6.7MMX8CM UMB678

## (undated) DEVICE — SUCTION CANNULA UTERINE 07MM CVD  21853

## (undated) DEVICE — TUBING SUCTION VACUUM COLLECTION 6FT 610

## (undated) DEVICE — GLOVE PROTEXIS BLUE W/NEU-THERA 6.5  2D73EB65

## (undated) DEVICE — NDL INSUFFLATION 13GA 120MM C2201

## (undated) DEVICE — LINEN TOWEL PACK X5 5464

## (undated) DEVICE — GLOVE PROTEXIS W/NEU-THERA 7.0  2D73TE70

## (undated) DEVICE — ESU FCP OLYMPUS PK CUTTING 5MMX33CM PK-CF0533

## (undated) DEVICE — ESU GROUND PAD UNIVERSAL W/O CORD

## (undated) DEVICE — PACK TVT HYSTEROSCOPY SMA15HYFSE

## (undated) DEVICE — SOL ADH LIQUID BENZOIN SWAB 0.6ML C1544

## (undated) DEVICE — Device

## (undated) DEVICE — ENDO POUCH UNIVERSAL RETRIEVAL SYSTEM INZII 5MM CD003

## (undated) DEVICE — CATH INTERMITTENT CLEAN-CATH FEMALE 14FR 6" VINYL LF 420614

## (undated) DEVICE — KIT PATIENT POSITIONING PIGAZZI LATEX FREE 40580

## (undated) DEVICE — ENDO SCOPE WARMER LF TM500

## (undated) DEVICE — ENDO TROCAR FIRST ENTRY KII FIOS Z-THRD 05X100MM CTF03

## (undated) DEVICE — SUCTION CANISTER MEDIVAC LINER 3000ML W/LID 65651-530

## (undated) DEVICE — PREP DURAPREP 26ML APL 8630

## (undated) DEVICE — ENDO TROCAR SLEEVE KII Z-THREADED 05X100MM CTS02

## (undated) DEVICE — ESU HOLDER LAP INST DISP PURPLE LONG 330MM H-PRO-330

## (undated) DEVICE — SU VICRYL 4-0 PS-2 18" UND J496H

## (undated) DEVICE — GLOVE PROTEXIS W/NEU-THERA 6.5  2D73TE65

## (undated) DEVICE — SOL WATER IRRIG 1000ML BOTTLE 2F7114

## (undated) DEVICE — SUCTION IRR STRYKERFLOW II W/TIP 250-070-520

## (undated) DEVICE — EVAC SYSTEM CLEAR FLOW SC082500

## (undated) DEVICE — SOL NACL 0.9% INJ 1000ML BAG 2B1324X

## (undated) DEVICE — DRSG STERI STRIP 1/4X3" R1541

## (undated) RX ORDER — LIDOCAINE HYDROCHLORIDE 20 MG/ML
INJECTION, SOLUTION EPIDURAL; INFILTRATION; INTRACAUDAL; PERINEURAL
Status: DISPENSED
Start: 2021-02-19

## (undated) RX ORDER — BUPIVACAINE HYDROCHLORIDE AND EPINEPHRINE 5; 5 MG/ML; UG/ML
INJECTION, SOLUTION EPIDURAL; INTRACAUDAL; PERINEURAL
Status: DISPENSED
Start: 2021-02-19

## (undated) RX ORDER — CEFAZOLIN SODIUM 1 G/3ML
INJECTION, POWDER, FOR SOLUTION INTRAMUSCULAR; INTRAVENOUS
Status: DISPENSED
Start: 2021-02-19

## (undated) RX ORDER — PROPOFOL 10 MG/ML
INJECTION, EMULSION INTRAVENOUS
Status: DISPENSED
Start: 2021-02-19

## (undated) RX ORDER — GLYCOPYRROLATE 0.2 MG/ML
INJECTION, SOLUTION INTRAMUSCULAR; INTRAVENOUS
Status: DISPENSED
Start: 2021-02-19

## (undated) RX ORDER — HYDROMORPHONE HYDROCHLORIDE 1 MG/ML
INJECTION, SOLUTION INTRAMUSCULAR; INTRAVENOUS; SUBCUTANEOUS
Status: DISPENSED
Start: 2021-02-19

## (undated) RX ORDER — DEXAMETHASONE SODIUM PHOSPHATE 4 MG/ML
INJECTION, SOLUTION INTRA-ARTICULAR; INTRALESIONAL; INTRAMUSCULAR; INTRAVENOUS; SOFT TISSUE
Status: DISPENSED
Start: 2021-02-19

## (undated) RX ORDER — FENTANYL CITRATE 50 UG/ML
INJECTION, SOLUTION INTRAMUSCULAR; INTRAVENOUS
Status: DISPENSED
Start: 2021-02-19

## (undated) RX ORDER — BUPIVACAINE HYDROCHLORIDE AND EPINEPHRINE 2.5; 5 MG/ML; UG/ML
INJECTION, SOLUTION EPIDURAL; INFILTRATION; INTRACAUDAL; PERINEURAL
Status: DISPENSED
Start: 2021-02-19

## (undated) RX ORDER — ONDANSETRON 2 MG/ML
INJECTION INTRAMUSCULAR; INTRAVENOUS
Status: DISPENSED
Start: 2021-02-19

## (undated) RX ORDER — NEOSTIGMINE METHYLSULFATE 1 MG/ML
VIAL (ML) INJECTION
Status: DISPENSED
Start: 2021-02-19